# Patient Record
Sex: MALE | Race: WHITE
[De-identification: names, ages, dates, MRNs, and addresses within clinical notes are randomized per-mention and may not be internally consistent; named-entity substitution may affect disease eponyms.]

---

## 2020-02-18 ENCOUNTER — HOSPITAL ENCOUNTER (INPATIENT)
Dept: HOSPITAL 74 - JER | Age: 65
LOS: 2 days | Discharge: NURSING, REHAB OR LONG-TERM CARE | DRG: 65 | End: 2020-02-20
Attending: NURSE PRACTITIONER | Admitting: INTERNAL MEDICINE
Payer: COMMERCIAL

## 2020-02-18 VITALS — BODY MASS INDEX: 24.5 KG/M2

## 2020-02-18 DIAGNOSIS — I10: ICD-10-CM

## 2020-02-18 DIAGNOSIS — Z91.81: ICD-10-CM

## 2020-02-18 DIAGNOSIS — G81.94: ICD-10-CM

## 2020-02-18 DIAGNOSIS — I63.89: Primary | ICD-10-CM

## 2020-02-18 DIAGNOSIS — N17.9: ICD-10-CM

## 2020-02-18 LAB
ALBUMIN SERPL-MCNC: 3.8 G/DL (ref 3.4–5)
ALP SERPL-CCNC: 84 U/L (ref 45–117)
ALT SERPL-CCNC: 31 U/L (ref 13–61)
ANION GAP SERPL CALC-SCNC: 7 MMOL/L (ref 8–16)
AST SERPL-CCNC: 31 U/L (ref 15–37)
BASOPHILS # BLD: 0.9 % (ref 0–2)
BILIRUB SERPL-MCNC: 0.8 MG/DL (ref 0.2–1)
BUN SERPL-MCNC: 17.3 MG/DL (ref 7–18)
CALCIUM SERPL-MCNC: 8.8 MG/DL (ref 8.5–10.1)
CHLORIDE SERPL-SCNC: 104 MMOL/L (ref 98–107)
CHOLEST SERPL-MCNC: 141 MG/DL (ref 50–200)
CO2 SERPL-SCNC: 27 MMOL/L (ref 21–32)
CREAT SERPL-MCNC: 1.5 MG/DL (ref 0.55–1.3)
DEPRECATED RDW RBC AUTO: 13.6 % (ref 11.9–15.9)
EOSINOPHIL # BLD: 4.2 % (ref 0–4.5)
GLUCOSE SERPL-MCNC: 83 MG/DL (ref 74–106)
HCT VFR BLD CALC: 42.4 % (ref 35.4–49)
HDLC SERPL-MCNC: 36 MG/DL (ref 40–60)
HGB BLD-MCNC: 14.4 GM/DL (ref 11.7–16.9)
LDLC SERPL CALC-MCNC: 86 MG/DL (ref 5–100)
LYMPHOCYTES # BLD: 20.3 % (ref 8–40)
MCH RBC QN AUTO: 28.2 PG (ref 25.7–33.7)
MCHC RBC AUTO-ENTMCNC: 33.9 G/DL (ref 32–35.9)
MCV RBC: 83.2 FL (ref 80–96)
MONOCYTES # BLD AUTO: 10.1 % (ref 3.8–10.2)
NEUTROPHILS # BLD: 64.5 % (ref 42.8–82.8)
PLATELET # BLD AUTO: 244 K/MM3 (ref 134–434)
PMV BLD: 7.5 FL (ref 7.5–11.1)
POTASSIUM SERPLBLD-SCNC: 4 MMOL/L (ref 3.5–5.1)
PROT SERPL-MCNC: 7.2 G/DL (ref 6.4–8.2)
RBC # BLD AUTO: 5.09 M/MM3 (ref 4–5.6)
SODIUM SERPL-SCNC: 138 MMOL/L (ref 136–145)
TRIGL SERPL-MCNC: 141 MG/DL (ref 0–150)
WBC # BLD AUTO: 7.3 K/MM3 (ref 4–10)

## 2020-02-18 RX ADMIN — HEPARIN SODIUM SCH UNIT: 5000 INJECTION, SOLUTION INTRAVENOUS; SUBCUTANEOUS at 23:27

## 2020-02-18 NOTE — ECHO
Version:  1



Name:  SANDIP WYNNE                             Exam: Adult Echocardiogram

MRN:  [id number]                                       Study Date:  2020, 3:17 PM

Age:  64 Years



 ____________________________________________________________________________________________________
__________



MMode/2D Measurements & Calculations





IVSd: 1.87 cm                                          LVIDs: 2.9 cm

LVIDd: 4.2 cm

LVPWd: 1.12 cm

LAV (MOD-bp):  47.0 ml

ACS: 2.20 cm                                           Ao root diam: 3.6 cm

LVOT diam: 2.28 cm                                     LA dimension: 3.4 cm



Doppler Measurements & Calculations



MV E max enrique: 57.3 cm/sec                              Med E/e':  17.2

MV A max enrique: 86.4 cm/sec                              Med Peak E' Enrique:  3.3 cm/sec

MV E/A: 0.66

Lat E/e':  9.5

Lat Peak E' Enrique:  6.0 cm/sec

Ao max P.1 mmHg                                    JACK(I,D): 3.7 cm

Ao mean P.18 mmHg                                  LV V1 mean: 56.7 cm/sec

Ao V2 max: 101.0 cm/sec                                LV V1 mean P.50 mmHg

                                                       TR max enrique: 207.5 cm/sec

                                                       TR max P.2 mmHg



 Left Ventricle

 The left ventricle is grossly normal size. There is moderate asymmetric left ventricular hypertrophy
. Left

 ventricular systolic function is normal. Ejection Fraction = 65%. The transmitral spectral Doppler f
low

 pattern is suggestive of impaired LV relaxation.

 Right Ventricle

 The right ventricle is normal in size and function.

 Atria

 Normal left and right atrial size and function.

 Mitral Valve

 The mitral valve is normal in structure and function. There is trace mitral regurgitation.

 Tricuspid Valve

 The tricuspid valve is normal in structure and function. There is mild tricuspid regurgitation.

 Aortic Valve

 The aortic valve is normal in structure and function. Mild aortic regurgitation.

 Pulmonic Valve

 The pulmonic valve is not well seen, but is grossly normal.

 Great Vessels

 The aortic root is normal size. Normal aortic arch, descending and ascending aorta.

 Pericardium/Pleura

 There is no pericardial effusion.







 Summary Statements

 The left ventricle is grossly normal size.

 There is moderate asymmetric left ventricular hypertrophy.

 Left ventricular systolic function is normal.

 Ejection Fraction = 65%.

 The right ventricle is normal in size and function.

 Normal left and right atrial size and function.

 The mitral valve is normal in structure and function.

 There is trace mitral regurgitation.

 The tricuspid valve is normal in structure and function.

 There is mild tricuspid regurgitation.

 The transmitral spectral Doppler flow pattern is suggestive of impaired LV relaxation.

 The aortic valve is normal in structure and function.

 Mild aortic regurgitation.

 The pulmonic valve is not well seen, but is grossly normal.

 The aortic root is normal size.

 Normal aortic arch, descending and ascending aorta

 There is no pericardial effusion.





                    Matthew Georgeemberg         2020, 4:49 PM

 Ordering Physician:  Carlos Salinas

 Referring Physician:  CARLOS SALINAS

 Performed By:  Cheryl Franks

## 2020-02-18 NOTE — HP
Admitting History and Physical





- Primary Care Physician


PCP: ON STAFF,NOT





- Admission


Chief Complaint: CVA in [place]


History of Present Illness: 





64y M hx of Htn, recent CVA on 2/14 with residual L sided weakness who 

presented to ED for further evaluation of his stroke. Pt is primarily German 

as per his  sister who is bedside the patient had 2 strokes since Thursday had 

gone to the emergency department with diagnosis of stroke and discharged home 

the same day.  [place] the [place] for further [place] as 

they were concerned that the patient was not getting optimal treatment.  The 

patient denies any new complaints he notes he does have residual weakness in 

his left arm and left leg however he is otherwise been feeling fine denies any 

fever, chills, headache, dizziness, palpitations, chest pain, abdominal pain, 

nausea, vomiting, vision changes, dysarthria, vertigo. 





History Source: Family Member (sister)


Limitations to Obtaining History: Language Barrier (German Speaking)





- Past Medical History


CNS: Yes: CVA (on 2/14 in [place])


Cardiovascular: Yes: HTN





- Smoking History


Smoking history: Never smoked


Have you smoked in the past 12 months: No





- Alcohol/Substance Use


Hx Alcohol Use: No


History of Substance Use: reports: None





- Social History


Usual Living Arrangement: Yes: With Spouse


Do you think of yourself as: Straight/Heterosexual


ADL: Independent


Occupation: unemployed


History of Recent Travel: Yes ([place])





Home Medications





- Allergies


Allergies/Adverse Reactions: 


 Allergies











Allergy/AdvReac Type Severity Reaction Status Date / Time


 


No Known Allergies Allergy   Verified 02/18/20 09:58














- Home Medications


Home Medications: 


Ambulatory Orders





Amlodipine Besylate [Norvasc -] 5 mg PO DAILY 02/18/20 


Aspirin [ASA -] 325 mg PO DAILY 02/18/20 


Losartan Potassium [Cozaar -] 50 mg PO DAILY 02/18/20 


Ramipril [Altace] 10 mg PO DAILY 02/18/20 











Family Medical History


Family Hx Cardiac Disorders: Father (Htn)





Review of Systems





- Review of Systems


Constitutional: reports: No Symptoms


Eyes: reports: No Symptoms


HENT: reports: No Symptoms


Neck: reports: No Symptoms


Cardiovascular: reports: No Symptoms


Respiratory: reports: No Symptoms


Gastrointestinal: reports: No Symptoms


Genitourinary: reports: No Symptoms


Breasts: reports: No Symptoms Reported


Musculoskeletal: reports: No Symptoms, Muscle Weakness


Integumentary: reports: No Symptoms


Neurological: reports: Numbness, Parasthesia, Unsteady Gait, Weakness (to LUE), 

Other (Left facial droop)


Endocrine: reports: No Symptoms


Hematology/Lymphatic: reports: No Symptoms


Psychiatric: reports: No Symptoms





Physical Examination


Vital Signs: 


 Vital Signs











Temperature  97.6 F   02/18/20 09:59


 


Pulse Rate  89   02/18/20 09:59


 


Respiratory Rate  16   02/18/20 09:59


 


Blood Pressure  170/98   02/18/20 09:59


 


O2 Sat by Pulse Oximetry (%)  98   02/18/20 09:59











Constitutional: Yes: Well Nourished, No Distress, Calm


Eyes: Yes: WNL, Conjunctiva Clear, EOM Intact


HENT: Yes: WNL, Atraumatic, Normocephalic


Neck: Yes: WNL, Supple, Trachea Midline


Cardiovascular: Yes: WNL, Regular Rate and Rhythm


Respiratory: Yes: WNL, Regular, CTA Bilaterally


Gastrointestinal: Yes: WNL, Normal Bowel Sounds


...Rectal Exam: Yes: Deferred


Renal/: Yes: WNL


Breast(s): Yes: WNL


Musculoskeletal: Yes: WNL


Extremities: Yes: WNL


Edema: No


Peripheral Pulses WNL: Yes


Peripheral Pulses: Left Radial: 2+, Right Radial: 2+, Left Doralis Pedis: 2+, 

Right Dorsalis Pedis: 2+, Left Femoral: 2+, Right Femoral: 2+


Integumentary: Yes: WNL


Neurological: Yes: Cran Nerves II-XII Intact, Facial Droop (to left), Weakness (

to LUE/LLE 3/5)


...Motor Strength: LUE (3/5), LLE (3/5)


Labs: 


 CBC, BMP





 02/18/20 10:47 





 02/18/20 10:47 











Imaging





- Results


Cat Scan: Report Reviewed (From [place]: Infra as well as supratentorial 

involutional changes noted with longstanding severe small vessel disease with 

ischemic white matter changes. Lacunar infarct withion the left sided thalmus 

and right sided basal ganglia compartment with an area of remote infarction 

within the left basal ganglia . No acute hemmorhage)


Ultrasound: Report Reviewed (Carotid dopplers: no evidence of hemodynamically 

significant stenosis)





Problem List





- Problems


(1) HTN (hypertension)


Assessment/Plan: 


on previous antihypertensives


c/w losartan, amlodopin


hold ramipril as Cr 1.6  is elevated with no know baseline


Code(s): I10 - ESSENTIAL (PRIMARY) HYPERTENSION   





(2) Prophylactic measure


Assessment/Plan: 


FEN


Fluids: Cr elevated, will give 1000cc NS bolus and assess fluid status


Electrolytes: monitor & replete as needed


Nutrition: low sodium diet





DVT


moderate risk


sq heparin


full dose ASA





Dispo


admitt as inpatient on tele


full code


discharge planning


Code(s): Z29.9 - ENCOUNTER FOR PROPHYLACTIC MEASURES, UNSPECIFIED   





(3) ANIYA (acute kidney injury)


Assessment/Plan: 


Cr 1.6 no known baseline


possible r/t dehydration-flew from OR today and came straight from airport


NS 1000 blous and continue @ 75cc/hr until tomorrow and re-assess


U/A and urine lytes pending


hold ramipril


avoid neprotoxic agents


is Cr does not decrease with investigate further-renal US, renal consult


Code(s): N17.9 - ACUTE KIDNEY FAILURE, UNSPECIFIED   





(4) Cerebrovascular accident (CVA)


Assessment/Plan: 


CVA on imaging from 2/14 from [place]


neurology consulsulted


Carotid dopplers done and noted


TTE ordered


Speeech/swallow eval requested


PT order


fall precautions


atorvastatin 80mg now and continue q hs


asa full dose


MRI for further evaluation





Code(s): I63.9 - CEREBRAL INFARCTION, UNSPECIFIED   


Qualifiers: 


   CVA mechanism: unspecified   Qualified Code(s): I63.9 - Cerebral infarction, 

unspecified   





(5) At risk for falls


Assessment/Plan: 


fall precautions


PT


Code(s): Z91.81 - HISTORY OF FALLING   





Visit type





- Emergency Visit


Emergency Visit: Yes


ED Registration Date: 02/18/20


Care time: The patient presented to the Emergency Department on the above date 

and was hospitalized for further evaluation of their emergent condition.





- New Patient


This patient is new to me today: Yes


Date on this admission: 02/18/20





- Critical Care


Critical Care patient: No





NIH Stroke Scale





- Last Known Well Date/Time & Onset


Date Last Known Well: 02/14/20


Time Last Known Well: 09:00





- Initial Evaluation


Level of consciousness: Alert


Ask patient the month and their age: Answers both correctly


Ask patient to open & close eyes; make fist and let go: Obeys both correctly


Best gaze (horizontal eye movement): Normal


Visual field testing: No visual field loss


Facial paresis (Show teeth/raise eyebrows/close eyes tight): Minor paralysis (

flattened nasolabial fold, asymmetry on smiling)


Motor Function: Left Arm: Drift


Motor Function:  Right Arm: Normal (extends arm 90 (or 45) degrees for 10 

seconds without drift


Motor Function:  Left Leg: Drift


Motor Function:  Right Leg: Normal (extends leg 30 degrees for 5 seconds 

without drift)


Limb Ataxia: Present in one limb


Sensory(Use pinprick test arms,legs,trunk,face/side to side): Normal


Best language (Describe picture, name items, read sentences): No Aphasia


Dysarthria (read several words): Normal articulation


Extinction and Inattention: No abnormality





- Total Score


NIH Stroke Scale Score: 4

## 2020-02-18 NOTE — PDOC
Documentation entered by Maryann Raymond SCRIBE, acting as scribe for Junior Nascimento MD.








Junior Nascimento MD:  This documentation has been prepared by the Segundo henry Xhesika, SCRIBE, under my direction and personally reviewed by me in its 

entirety.  I confirm that the documentation accurately reflects all work, 

treatment, procedures, and medical decision making performed by me.  





Attending Attestation





- Resident


Resident Name: OrjamiHoang





- ED Attending Attestation


I have performed the following: I have examined & evaluated the patient, The 

case was reviewed & discussed with the resident, I agree w/resident's findings 

& plan, Exceptions are as noted





- HPI


HPI: 





02/18/20 10:53


64y M hx of htn, recent cva with residual L sided weakness presents for further 

evaluation of his stroke.  Per the patient's sister who is bedside the patient 

had 2 strokes since Thursday had gone to the emergency department with 

diagnosis of stroke and discharged home.  [place] 

[place] further evaluation as they were concerned that the patient was not getting 

optimal treatment.  The patient denies any new complaints he notes he does have 

residual weakness in his left arm and left leg however he is otherwise been 

feeling fine denies any fever, chills, headache, dizziness, palpitations, chest 

pain, abdominal pain, nausea, vomiting, vision changes, dysarthria, vertigo. 





Physicial Exam:


GENERAL: The patient is awake, alert, and fully oriented, Nontoxic - in no 

acute distress.


HEAD: Normocephalic, atraumatic.


EYES: extraocular movements intact, sclera anicteric, conjunctiva clear.  

Visual fields intact by confrontation


ENT: Normal voice,  Moist mucous membranes.


NECK: Normal range of motion, supple


LUNGS: Breath sounds equal, clear to auscultation bilaterally.  No wheezes, no 

rhonchi, no rales.


HEART: Regular rate and rhythm, normal S1 and S2 without murmur, rub or gallop.


ABDOMEN: Soft, nontender, No guarding, no rebound.  No CVA tenderness


EXTREMITIES: Normal range of motion, no edema.  


NEUROLOGICAL: No facial assymetry, Normal speech, left upper extremity and left 

lower extremity 4/5, right upper extremity right lower extremity 5 out of 5, 

sensation intact throughout on upper and lower extremities. 


PSYCH: Normal mood, normal affect.


SKIN: Warm, Dry, normal turgor,








Will obtain screening blood work


The patient has no new complaints however he will likely benefit from close 

outpatient primary care and neurologic follow-up with potential physical 

therapy. 











- Physicial Exam


PE: 





02/22/20 10:18


see above





- Medical Decision Making





case was discussed with neurology - recommends admission for further medical 

optimization and as pt will likely nto easily get follow up being new to the 

area





**Heart Score/ECG Review





- ECG Impressions


Comment:: 





02/18/20 13:44


Twelve-lead EKG was performed and reviewed by me. 


There is normal sinus rhythm with a normal rate. 


Rate of 84


QT interval of 467


Nonspecific ST wave changes


LVH

## 2020-02-18 NOTE — EKG
Test Reason : 

Blood Pressure : ***/*** mmHG

Vent. Rate : 084 BPM     Atrial Rate : 084 BPM

   P-R Int : 180 ms          QRS Dur : 092 ms

    QT Int : 396 ms       P-R-T Axes : 008 036 061 degrees

   QTc Int : 467 ms

 

NORMAL SINUS RHYTHM

VOLTAGE CRITERIA FOR LEFT VENTRICULAR HYPERTROPHY

NONSPECIFIC ST AND T WAVE ABNORMALITY

PROLONGED QT

ABNORMAL ECG

NO PREVIOUS ECGS AVAILABLE

Confirmed by MD DAVID, LIZETH ([id number]) on 2/18/2020 4:23:56 PM

 

Referred By:             Confirmed By:LIZETH HERNANDEZ MD

## 2020-02-18 NOTE — PDOC
History of Present Illness





- General


Chief Complaint: CVA/TIA


Stated Complaint: CVA/TIA


Time Seen by Provider: 02/18/20 10:14


History Source: Patient, Family


Exam Limitations: Language Barrier (Pt refused , sister at bedside)





- History of Present Illness


Initial Comments: 





02/18/20 10:52


64M with a PMH of HTN who presents for further evaluation of a CVA. The patient 

had a CVA 4 days ago in [place] with residual L sided weakness. He presents 

today for further evaluation. He denies headache, numbness, tingling, chest pain

, SOB, nausea, vomiting, fever, chills, cough. He denies any worsening of his 

previously diagnosed stroke.





Past History





- Past Medical History


Allergies/Adverse Reactions: 


 Allergies











Allergy/AdvReac Type Severity Reaction Status Date / Time


 


No Known Allergies Allergy   Verified 02/18/20 09:58











Home Medications: 


Ambulatory Orders





Amlodipine Besylate [Norvasc -] 5 mg PO DAILY 02/18/20 


Aspirin [ASA -] 325 mg PO DAILY 02/18/20 


Losartan Potassium [Cozaar -] 50 mg PO DAILY 02/18/20 


Ramipril [Altace] 10 mg PO DAILY 02/18/20 








CVA: Yes


COPD: No


HTN: Yes





- Psycho Social/Smoking Cessation Hx


Smoking History: Never smoked


Have you smoked in the past 12 months: No


Hx Alcohol Use: No


Drug/Substance Use Hx: No





**Review of Systems





- Review of Systems


Able to Perform ROS?: Yes


Comments:: 





02/18/20 11:07


GENERAL/CONSTITUTIONAL: No fever or chills. No weakness.


HEAD, EYES, EARS, NOSE AND THROAT: No change in vision. No ear pain or 

discharge. No sore throat.


CARDIOVASCULAR: No chest pain, palpitations, or lightheadedness.


RESPIRATORY: No cough, wheezing, shortness of breath, or hemoptysis.


GASTROINTESTINAL: No abdominal pain, nausea, vomiting, diarrhea, or 

constipation. 


GENITOURINARY: No dysuria, frequency, hematuria, or change in urination.


MUSCULOSKELETAL: No joint or muscle swelling or pain. No neck or back pain.


SKIN: No rash or lesions. 


NEUROLOGIC: + for weakness. No headache, numbness, tingling, loss of 

consciousness, or change in strength/sensation.





Is the patient limited English proficient: No





*Physical Exam





- Vital Signs


 Last Vital Signs











Temp Pulse Resp BP Pulse Ox


 


 97.6 F   89   16   170/98   98 


 


 02/18/20 09:59  02/18/20 09:59  02/18/20 09:59  02/18/20 09:59  02/18/20 09:59














- Physical Exam





02/18/20 11:07


GENERAL: Well developed, well nourished. Awake and alert. No acute distress.


HEENT: Normocephalic, atraumatic. Hearing grossly normal. Moist mucous 

membranes. PERRLA, EOMI. No conjunctival pallor. Sclera are non-icteric. 

Oropharynx is clear.


NECK: Supple. Full ROM. No JVD.


CARDIOVASCULAR: Regular rate and rhythm. No murmurs, rubs, or gallops. Distal 

pulses are 2+ and symmetric. 


PULMONARY: No evidence of respiratory distress. Lungs clear to auscultation 

bilaterally. No wheezing, rales, or rhonchi.


ABDOMINAL: Soft. Non-tender. Non-distended. No rebound or guarding. 


GENITOURINARY: No CVA tenderness bilaterally.


MUSCULOSKELETAL: Normal range of motion at all joints. No bony deformities or 

tenderness. 


EXTREMITIES: No cyanosis. No clubbing. No edema. No calf tenderness or swelling.


SKIN: Warm and dry. Normal capillary refill. No rashes. No jaundice. 


NEUROLOGICAL: Alert, awake, appropriate. Cranial nerves 2-12 intact. No 

deficits to light touch and temperature in face, upper extremities and lower 

extremities. 3/5 strength in L biceps, triceps, quads, hamstrings and gastroc. 

Otherwise, 5/5 strength in deltoids, biceps, triceps, quadriceps, hamstrings, 

and gastrocnemius. Finger to nose normal bilaterally. Normal speech. Gait is 

normal without ataxia.


PSYCHIATRIC: Cooperative. Good eye contact. Appropriate mood and affect.








NIH Stroke Scale





- Last Known Well Date/Time & Onset


Date Last Known Well: 02/06/20


Time Last Known Well: 00:00





- Initial Evaluation


Level of consciousness: Alert


Ask patient the month and their age: Answers both correctly


Ask patient to open & close eyes; make fist and let go: Obeys both correctly


Best gaze (horizontal eye movement): Normal


Visual field testing: No visual field loss


Facial paresis (Show teeth/raise eyebrows/close eyes tight): Normal symmetrical 

movement


Motor Function: Left Arm: Drift


Motor Function:  Right Arm: Normal (extends arm 90 (or 45) degrees for 10 

seconds without drift


Motor Function:  Left Leg: Drift


Motor Function:  Right Leg: Normal (extends leg 30 degrees for 5 seconds 

without drift)


Limb Ataxia: Present in one limb


Sensory(Use pinprick test arms,legs,trunk,face/side to side): Normal


Best language (Describe picture, name items, read sentences): No Aphasia


Dysarthria (read several words): Normal articulation


Extinction and Inattention: No abnormality





- Total Score


NIH Stroke Scale Score: 3





tPA Exclusion checklist 3-4.5h





- Time Elapsed


Date last known well: 02/13/20


Time last known well: 00:00


Elaspsed time: 5 Day(s) and 13 Hour(s) and 1 Minutes 





- Thrombolytic Therapy Candidate


Is patient eligible for thrombolytic therapy: No





- Ineligibility reason(s)


Reasons No tPA given: Outside of window - delayed arrival





Critical Care Time/MDM Note





- Medical Decision Making


Note: 





02/18/20 11:10


64M with a PMH of HTN, noncompliant with his medications, who presents 5 days 

after being diagnosed with a CVA with L sided residual deficits. Pt otherwise 

well appearing and recently flew in 1 hour ago prior to arrival. Pt had 

paperwork from [place] showing a CT with infarcts and labs indicating a slightly 

elevated Cr of 1.5. Will repeat labs and communicate with social work for 

disposition, likely home with outpt neuro f/u.





02/18/20 12:16


Cr in our labs 1.5. Will d/w Dr. Prajapati, neuro, for outpt f/u.





02/18/20 12:44


Case d/w Dr. Prajapati who suggests admission for further workup. Will add lipid 

profile.





02/18/20 13:01


Pt endorsed to hospitalist NP for admission under Dr. Olivares.








Discharge





- Discharge Information


Problems reviewed: Yes


Clinical Impression/Diagnosis: 


Cerebrovascular accident (CVA)


Qualifiers:


 CVA mechanism: unspecified Qualified Code(s): I63.9 - Cerebral infarction, 

unspecified





Condition: Guarded





- Admission


Yes





- Follow up/Referral





- Patient Discharge Instructions





- Post Discharge Activity

## 2020-02-19 LAB
ALBUMIN SERPL-MCNC: 3.4 G/DL (ref 3.4–5)
ALP SERPL-CCNC: 78 U/L (ref 45–117)
ALT SERPL-CCNC: 40 U/L (ref 13–61)
ANION GAP SERPL CALC-SCNC: 6 MMOL/L (ref 8–16)
AST SERPL-CCNC: 35 U/L (ref 15–37)
BILIRUB SERPL-MCNC: 1 MG/DL (ref 0.2–1)
BUN SERPL-MCNC: 19.7 MG/DL (ref 7–18)
CALCIUM SERPL-MCNC: 8.8 MG/DL (ref 8.5–10.1)
CHLORIDE SERPL-SCNC: 104 MMOL/L (ref 98–107)
CO2 SERPL-SCNC: 28 MMOL/L (ref 21–32)
CREAT SERPL-MCNC: 1.5 MG/DL (ref 0.55–1.3)
DEPRECATED RDW RBC AUTO: 13.4 % (ref 11.9–15.9)
GLUCOSE SERPL-MCNC: 88 MG/DL (ref 74–106)
HCT VFR BLD CALC: 40.1 % (ref 35.4–49)
HGB BLD-MCNC: 13.6 GM/DL (ref 11.7–16.9)
INR BLD: 1.05 (ref 0.83–1.09)
MAGNESIUM SERPL-MCNC: 2.2 MG/DL (ref 1.8–2.4)
MCH RBC QN AUTO: 28.4 PG (ref 25.7–33.7)
MCHC RBC AUTO-ENTMCNC: 34 G/DL (ref 32–35.9)
MCV RBC: 83.5 FL (ref 80–96)
PLATELET # BLD AUTO: 237 K/MM3 (ref 134–434)
PMV BLD: 7.5 FL (ref 7.5–11.1)
POTASSIUM SERPLBLD-SCNC: 4.3 MMOL/L (ref 3.5–5.1)
PROT SERPL-MCNC: 6.5 G/DL (ref 6.4–8.2)
PT PNL PPP: 12.4 SEC (ref 9.7–13)
RBC # BLD AUTO: 4.8 M/MM3 (ref 4–5.6)
SODIUM SERPL-SCNC: 139 MMOL/L (ref 136–145)
WBC # BLD AUTO: 9.3 K/MM3 (ref 4–10)

## 2020-02-19 RX ADMIN — HEPARIN SODIUM SCH UNIT: 5000 INJECTION, SOLUTION INTRAVENOUS; SUBCUTANEOUS at 10:30

## 2020-02-19 RX ADMIN — ASPIRIN 81 MG SCH MG: 81 TABLET ORAL at 10:29

## 2020-02-19 RX ADMIN — LOSARTAN POTASSIUM SCH MG: 50 TABLET, FILM COATED ORAL at 10:30

## 2020-02-19 RX ADMIN — AMLODIPINE BESYLATE SCH MG: 5 TABLET ORAL at 10:30

## 2020-02-19 NOTE — CON.CARD
Consult


Consult Specialty:: Cardiology


Referred by:: Marshall


Reason for Consultation:: Stroke and hypertension





- History of Present Illness


Chief Complaint: Stroke and hypertension


History of Present Illness: 


The patient is a 64-year-old man, with a history of hypertension, status post 

stroke 2/14/2020 with left-sided weakness residual, who presented today for 

further evaluation of his stroke and poorly controlled blood pressure.





The patient denied history of coronary artery disease and angina.  He reported 

no important limitations nor symptoms on effort prior to the stroke.





He is currently comfortable in the chair.  He offers no specific complaints.





The patient is in sinus rhythm.  There are no acute ECG changes.  Evidence of 

left ventricular hypertrophy.Echocardiogram performed on 2/18/2020 showed that 

both ventricles are functioning normally.  There were no other clinically 

significant findings on the echo.





- History Source


History Provided By: Patient, Medical Record


Limitations to Obtaining History: No Limitations





- Past Medical History


CNS: Yes: CVA (on 2/14 in [place])


Cardio/Vascular: Yes: HTN





- Alcohol/Substance Use


Hx Alcohol Use: No


History of Substance Use: reports: None





- Smoking History


Smoking history: Never smoked


Have you smoked in the past 12 months: No





- Social History


ADL: Independent


Occupation: unemployed


History of Recent Travel: Yes ([place])





Home Medications





- Allergies


Allergies/Adverse Reactions: 


 Allergies











Allergy/AdvReac Type Severity Reaction Status Date / Time


 


No Known Allergies Allergy   Verified 02/18/20 09:58














- Home Medications


Home Medications: 


Ambulatory Orders





Amlodipine Besylate [Norvasc -] 5 mg PO DAILY 02/18/20 


Aspirin [ASA -] 325 mg PO DAILY 02/18/20 


Losartan Potassium [Cozaar -] 50 mg PO DAILY 02/18/20 


Ramipril [Altace] 10 mg PO DAILY 02/18/20 











Review of Systems





- Review of Systems


Constitutional: reports: No Symptoms


Eyes: reports: No Symptoms


HENT: reports: No Symptoms


Neck: reports: No Symptoms


Cardiovascular: reports: No Symptoms


Respiratory: reports: No Symptoms


Gastrointestinal: reports: No Symptoms


Genitourinary: reports: No Symptoms


Breasts: reports: No Symptoms Reported


Musculoskeletal: reports: No Symptoms


Integumentary: reports: No Symptoms


Neurological: reports: Weakness


Endocrine: reports: No Symptoms


Hematology/Lymphatic: reports: No Symptoms


Vital Signs: 


 Vital Signs











Temperature  98.3 F   02/19/20 05:37


 


Pulse Rate  88   02/19/20 05:37


 


Respiratory Rate  18   02/19/20 08:43


 


Blood Pressure  152/90   02/19/20 05:37


 


O2 Sat by Pulse Oximetry (%)  98   02/19/20 08:43











Constitutional: Yes: Well Nourished, No Distress, Calm


Eyes: Yes: WNL, Conjunctiva Clear, EOM Intact


HENT: Yes: WNL, Atraumatic, Normocephalic


Neck: Yes: WNL, Supple, Trachea Midline


Respiratory: Yes: WNL, Regular, CTA Bilaterally


Gastrointestinal: Yes: WNL, Normal Bowel Sounds, Soft


Renal/: Yes: WNL


Cardiovascular: Yes: WNL, Regular Rate and Rhythm


JVD: No


Carotid Bruit: No


PMI: Non-Displaced


Heart Sounds: Yes: S1, S2


Musculoskeletal: Yes: WNL


Extremities: Yes: WNL


Edema: No


Neurological: Yes: Weakness (Left-sided weakness)





- Other Data


Labs, Other Data: 


 CBC, BMP





 02/19/20 05:25 





 02/19/20 05:25 





 INR, PTT











INR  1.05  (0.83-1.09)   02/19/20  05:25    














Assessment/Plan





The patient is a 64-year-old man, with a history of hypertension, status post 

stroke 2/14/2020 with left-sided weakness residual, who presented today for 

further evaluation of his stroke and poorly controlled blood pressure.





The patient denied history of coronary artery disease and angina.  He reported 

no important limitations nor symptoms on effort prior to the stroke.





He is currently comfortable in the chair.  He offers no specific complaints.





The patient is in sinus rhythm.  There are no acute ECG changes.  Evidence of 

left ventricular hypertrophy.Echocardiogram performed on 2/18/2020 showed that 

both ventricles are functioning normally.  There were no other clinically 

significant findings on the echo.





There is no evidence of ischemia nor acute coronary syndrome.  The blood 

pressure is mildly elevated.  The patient is completely asymptomatic with left-

sided weakness.





Please arrange for an outpatient follow-up visit with   


Please do not hesitate to call us PRN.

## 2020-02-19 NOTE — CONSULT
Admitting History and Physical





- Primary Care Physician


PCP: Kevan Salinas





- Admission


History of Present Illness: 


 64-year-old man, with a history of hypertension, status post stroke 2/14/2020 

in [place], with left-sided weakness residual, admitted for evaluation of 

his stroke and poorly controlled blood pressure.


Cat Scan: Report Reviewed (From [place]: Infra as well as supratentorial 

involutional changes noted with longstanding severe small vessel disease with 

ischemic white matter changes. Lacunar infarct withion the left sided thalmus 

and right sided basal ganglia compartment with an area of remote infarction 

within the left basal ganglia . No acute hemmorhage)


Ultrasound: Report Reviewed (Carotid dopplers: no evidence of hemodynamically 

significant stenosis)


 Selected Entries











  02/18/20 02/18/20 02/18/20





  09:59 19:10 19:45


 


Breakfast   


 


Diet Tolerated   


 


Eating (Feeding   Independent





) Ability   


 


Temperature 97.6 F 97.7 F 98.4 F














  02/19/20 02/19/20 02/19/20





  02:00 05:37 09:53


 


Breakfast   75%


 


Diet Tolerated   Well


 


Eating (Feeding   





) Ability   


 


Temperature 98.0 F 98.3 F 








 Laboratory Tests











  02/18/20 02/19/20





  10:47 05:25


 


WBC  7.3  9.3








Passed Dysphagia screens x 2, and diet initiated with good tolerance, per staff.


History Source: Patient


Limitations to Obtaining History: No Limitations, Language Barrier





- Past Medical History


CNS: Yes: CVA (on 2/14 in [place])


Cardiovascular: Yes: HTN





- Smoking History


Smoking history: Never smoked


Have you smoked in the past 12 months: No





- Alcohol/Substance Use


Hx Alcohol Use: No


History of Substance Use: reports: None





- Social History


ADL: Independent


Occupation: unemployed


History of Recent Travel: Yes ([place])





History





- Admission


Reason For Visit: CVA





- Diagnostics


CT Scan: Report Reviewed (Cat Scan: Report Reviewed (From [place] [place]: Infra as 

well as supratentorial involutional changes noted with longstanding severe 

small vessel disease with ischemic white matter changes. Lacunar infarct 

withion the left sided thalmus and right sided basal ganglia compartment with 

an area of remote infarction within the left basal ganglia . No acute hemmorhage

) Ultrasound: Report Reviewed (Carotid dopplers: no evidence of hemodynamically 

significant stenosis))


MRI: Pending





- General


Mental Status: Alert and Oriented, Awake and Alert, Able to Follow Commands


Attention: Intact


Ability to Follow Directions: Excellent


Head/Neck Control: WFL





- Hearing


Hearing: Normal


Hearing Aide: No





Speech Evaluation





- Communication


Primary Language: Kuwaiti


Communication: Yes: Within Normal Limits


Oral Expression Ability: Yes: No Impairment





- Speech Production


Able to Make Needs Known: Yes: WNL


Intelligibility: Yes: WNL





- Speech Characteristics


Voice Loudness: Normal


Voice Pitch: Yes: Normal


Voice Phonatory-based Quality: Yes: Normal


Speech Pattern: Normal


Speech Clarity: < 100%


Nasal Resonance: Normal


Articulation: Yes: Precise


Rate of Speech: Intact





- Language/Auditory Comprehension


Follows: Yes: 2 Stage Simple Commands


Observation: Able to respond to yes/no queries: Yes, Yes/No Confusion: No, 

Comprehends Conversational Speech: Yes





- Language/Verbal Expression


Able to Respond to Simple Queries: Yes: WNL


Able to Communicate Wants and Needs: Yes: WNL


Functional Communication Status: Yes: WNL


Attention: Yes: Intact





- Memory/Perception


Long term Memory: Yes: WNL


Short Term Memory: Yes: WNL





- Swallow Evaluation/Bedside Assessment


Current Nutritional Intake: Regular, Thin Liquids


Oral Secretions: Yes: WFL


Dentition: Yes: Adequate


Facial Symmetry at Rest: Symmetrical


Facial Symmetry on Retraction: Symmetrical


Sensation: Normal


Against Resistance Opening: Normal


Against Resistance Closing: Normal


Pucker Lips: Normal


Smile: Normal


Lingual Movement: Normal, Symmetric


Lingual Speed of Movement: Normal


Lingual Movement Strgth Against Opposition: Normal


Lingual Movement Characteristics: Normal


Velopharyngeal Movement: Normal


Laryngeal Elevation: WFL


Laryngeal Movement: Able to Palpate


Rate of Intake: WFL


Bolus Size: WFL


Labial Seal: WFL


Chewing: WFL


Oral Prep Time: WFL


A-P Transit: WFL


Pocketing: None


Timing of Swallow: WFL


Coughing/Throat Clear: No


Change in Voice: No





Recommendations





- Speech Evaluation, Impression/Plan


Impression: Speech,swallow,language, cognition intact.





- Dysphagia Impressions/Plan


Swallowing Skills: WFL


Dysphagia Impressions: No Impairment


*Silent aspiration: cannot be R/O at bedside





- Recommendations


Diet Consistency: Regular


Medication Administration: Whole with water


Liquids: Thin Liquids

## 2020-02-19 NOTE — PN
Physical Exam: 


SUBJECTIVE: Patient seen and examined at the bedside.  his family is present.  

patient denies any headaches or chest pain.  





OBJECTIVE:


Patient is a 64 year old male with a significant past medical history of a 

recent CVA on 2/14/2020 with residual left sided weakness.   Patient presents 

to the ED on 2/18/2020 for further evaluation.  Patient reports two more 

strokes since Thursday and has been to other EDs.  He presents to [facility] for 

further evaluation.  The patient denies any fever, chills, headache, dizziness, 

palpitations, chest pain, abdominal pain, nausea, vomiting, vision changes, 

dysarthria, vertigo. 





imaging:


brain mri 2/19/2020: (1) moderate to marked bilateral periventricular chronic 

microvascular ischemic disease. (2) bilatral periventricular chronic lacunar 

infarcts someof which demonstrate chronic blood products in particular a 

chronic hemorrhagic focal infart in the left periventricular white matter and 

left posterior basal ganglia.  superimposed acute/subacute lacunar infarcts are 

present in the right centrum semiovale posteriorly involving the right 

posterior frontal/parietal junction.  no mass lesions or acute intracranial 

hemorrhage identified. 





 Vital Signs











 Period  Temp  Pulse  Resp  BP Sys/Valdez  Pulse Ox


 


 Last 24 Hr  97.7 F-98.4 F    16-18  136-160/  98-99








GENERAL: The patient is awake, alert, and fully oriented, in no acute distress.


HEAD: Normal with no signs of trauma.


EYES: PERRL, extraocular movements intact, sclera anicteric, conjunctiva clear. 

No ptosis. 


ENT: Ears normal, nares patent, oropharynx clear without exudates, moist mucous 

membranes.


NECK: Trachea midline, full range of motion, supple. 


LUNGS: Breath sounds equal, clear to auscultation bilaterally, no wheezes, no 

crackles, no accessory muscle use. 


HEART: Regular rate and rhythm 


ABDOMEN: Soft, nontender, nondistended, normoactive bowel sounds, no guarding, 

no rebound, no hepatosplenomegaly, no masses.


EXTREMITIES:   no edema. 


NEUROLOGICAL:   Normal speech, left sided weakness.


PSYCH: Normal mood, normal affect.


SKIN: Warm, dry, normal turgor, no rashes or lesions noted


 Laboratory Results - last 24 hr











  02/19/20 02/19/20 02/19/20





  05:25 05:25 05:25


 


WBC  9.3  


 


RBC  4.80  


 


Hgb  13.6  


 


Hct  40.1  


 


MCV  83.5  


 


MCH  28.4  


 


MCHC  34.0  


 


RDW  13.4  


 


Plt Count  237  


 


MPV  7.5  


 


PT with INR   12.40 


 


INR   1.05 


 


Sodium    139


 


Potassium    4.3


 


Chloride    104


 


Carbon Dioxide    28


 


Anion Gap    6 L


 


BUN    19.7 H


 


Creatinine    1.5 H


 


Est GFR (CKD-EPI)AfAm    55.43


 


Est GFR (CKD-EPI)NonAf    47.82


 


Random Glucose    88


 


Calcium    8.8


 


Magnesium    2.2


 


Total Bilirubin    1.0


 


AST    35


 


ALT    40


 


Alkaline Phosphatase    78


 


Total Protein    6.5


 


Albumin    3.4








Active Medications











Generic Name Dose Route Start Last Admin





  Trade Name Freq  PRN Reason Stop Dose Admin


 


Amlodipine Besylate  5 mg  02/19/20 10:00  02/19/20 10:30





  Norvasc -  PO   5 mg





  DAILY ASHER   Administration





     





     





     





     


 


Aspirin  81 mg  02/19/20 10:00  02/19/20 10:29





  Asa -  PO   81 mg





  DAILY ASHER   Administration





     





     





     





     


 


Atorvastatin Calcium  20 mg  02/19/20 22:00  





  Lipitor -  PO   





  HS ASHER   





     





     





     





     


 


Losartan Potassium  50 mg  02/19/20 10:00  02/19/20 10:30





  Cozaar -  PO   50 mg





  DAILY ASHER   Administration





     





     





     





     











ASSESSMENT/PLAN:








Problem List





- Problems


(1) Cerebrovascular accident (CVA)


Assessment/Plan: 


CVA on per brain mri.  neuro consulted and pending


CVA protocol initiated, patient on asa, lipitor.


neuro following


for rehab placement pending insurance approval


continue with fall precautions, gait training


monitor and control blood pressure


Code(s): I63.9 - CEREBRAL INFARCTION, UNSPECIFIED   


Qualifiers: 


   CVA mechanism: unspecified   Qualified Code(s): I63.9 - Cerebral infarction, 

unspecified   





(2) ANIYA (acute kidney injury)


Assessment/Plan: 


Cr 1.5 no known baseline


U/A and urine lytes pending


hold ramipril


avoid neprotoxic agents


Code(s): N17.9 - ACUTE KIDNEY FAILURE, UNSPECIFIED   





(3) At risk for falls


Assessment/Plan: 


physical therapy


Code(s): Z91.81 - HISTORY OF FALLING   





(4) HTN (hypertension)


Assessment/Plan: 


on coozar and amlodopine


Code(s): I10 - ESSENTIAL (PRIMARY) HYPERTENSION   





(5) Prophylactic measure


Code(s): Z29.9 - ENCOUNTER FOR PROPHYLACTIC MEASURES, UNSPECIFIED   





Visit type





- Emergency Visit


Emergency Visit: Yes


ED Registration Date: 02/18/20


Care time: The patient presented to the Emergency Department on the above date 

and was hospitalized for further evaluation of their emergent condition.





- New Patient


This patient is new to me today: Yes


Date on this admission: 02/19/20





- Critical Care


Critical Care patient: No





- Discharge Referral


Referred to [facility] No

## 2020-02-19 NOTE — CONSULT
Consult - text type





- Consultation


Consultation Note: 





 Neurology





Admitting History and Physical





- Primary Care Physician


PCP: ON STAFF,NOT





- Admission


Chief Complaint: CVA in [place]


History Source: Family Member (sister)


Limitations to Obtaining History: Language Barrier ([place] Speaking)





History of Present Illness: 


64y M hx of Htn, recent CVA on 2/14 with residual L sided weakness who 

presented to ED for further evaluation of his stroke. Pt is primarily [place] 

as per his  sister who is bedside the patient had 2 strokes since Thursday had 

gone to the emergency department with diagnosis of stroke and discharged home 

the same day.  [place] further [place] as 

they were concerned that the patient was not getting optimal treatment.  The 

patient denied any new complaints he noted he does have residual weakness in 

his left arm and left leg however he is otherwise been feeling fine. Denied any 

fever, chills, headache, dizziness, palpitations, chest pain, abdominal pain, 

nausea, vomiting, vision changes, dysarthria, vertigo. Carotid u/s completed 

and showed intimal tickening and small soft plaque in R. bulb as well as mild 

intimal thickening at L. CCA bifurcation without HD significant stenosis. MRI 

of brain reviewed and awaiting official report. Patient indicated that in 

[place] he was not taking a daily aspirin and therefore should be started 

on 81 mg daily.  He has been put on 325 full dose aspirin which would not be 

needed at this pointsince he was aspirin winnie.   LDL was at 86, can reduce 

statin to 20mg, goal LDL < 70. Continues to have left-sided deficits and would 

benefit from physical therapy as well as short-term rehabilitation.





- Past Medical History


CNS: Yes: CVA (on 2/14 in [place])


Cardiovascular: Yes: HTN





- Smoking History


Smoking history: Never smoked


Have you smoked in the past 12 months: No





- Alcohol/Substance Use


Hx Alcohol Use: No


History of Substance Use: reports: None





- Social History


Usual Living Arrangement: Yes: With Spouse


Do you think of yourself as: Straight/Heterosexual


ADL: Independent


Occupation: unemployed


History of Recent Travel: Yes ([place])





Family Medical History


Family Hx Cardiac Disorders: Father (Htn)





Review of Systems





- Review of Systems


Constitutional: reports: No Symptoms


Eyes: reports: No Symptoms


HENT: reports: No Symptoms


Neck: reports: No Symptoms


Cardiovascular: reports: No Symptoms


Respiratory: reports: No Symptoms


Gastrointestinal: reports: No Symptoms


Genitourinary: reports: No Symptoms


Breasts: reports: No Symptoms Reported


Musculoskeletal: reports: No Symptoms, Muscle Weakness


Integumentary: reports: No Symptoms


Neurological: reports: Numbness, Parasthesia, Unsteady Gait, Weakness (to LUE), 

Other (Left facial droop)


Endocrine: reports: No Symptoms


Hematology/Lymphatic: reports: No Symptoms


Psychiatric: reports: No Symptoms


Home Medications





- Allergies


Allergies/Adverse Reactions: 


 Allergies











Allergy/AdvReac Type Severity Reaction Status Date / Time


 


No Known Allergies Allergy   Verified 02/18/20 09:58














- Home Medications


Home Medications: 


Ambulatory Orders





Amlodipine Besylate [Norvasc -] 5 mg PO DAILY 02/18/20 


Aspirin [ASA -] 325 mg PO DAILY 02/18/20 


Losartan Potassium [Cozaar -] 50 mg PO DAILY 02/18/20 


Ramipril [Altace] 10 mg PO DAILY 02/18/20 





Active Medications





Amlodipine Besylate (Norvasc -)  5 mg PO DAILY [facility]


Aspirin (Asa -)  325 mg PO DAILY [facility]


Atorvastatin Calcium (Lipitor -)  80 mg PO HS [facility]


Heparin Sodium (Porcine) (Heparin -)  5,000 unit SQ BID [facility]


   Last Admin: 02/18/20 23:27 Dose:  5,000 unit


Influenza Virus Vaccine Quadrival (Flulaval Quad 1414-0900)  60 mcg IM .ONCE ONE


   Stop: 02/19/20 10:01


Losartan Potassium (Cozaar -)  50 mg PO DAILY [facility]


Pneumococcal 13-Valent Conj Vacc (Prevnar 13 Syringe -)  0.5 ml IM .ONCE ONE


   Stop: 02/19/20 10:01








Physical Examination


Vital Signs: 


 Vital Signs











 Period  Temp  Pulse  Resp  BP Sys/Valdez  Pulse Ox


 


 Last 24 Hr  97.6 F-98.4 F  78-89  16-18  144-170/90-99  











Constitutional: Yes: Well Nourished, No Distress, Calm


Eyes: Yes: WNL, Conjunctiva Clear, EOM Intact


HENT: Yes: WNL, Atraumatic, Normocephalic


Neck: Yes: WNL, Supple, Trachea Midline


Cardiovascular: Yes: WNL, Regular Rate and Rhythm


Respiratory: Yes: WNL, Regular, CTA Bilaterally


Gastrointestinal: Yes: WNL, Normal Bowel Sounds


...Rectal Exam: Yes: Deferred


Renal/: Yes: WNL


Breast(s): Yes: WNL


Musculoskeletal: Yes: WNL


Extremities: Yes: WNL


Edema: No


Peripheral Pulses WNL: Yes


Peripheral Pulses: Left Radial: 2+, Right Radial: 2+, Left Doralis Pedis: 2+, 

Right Dorsalis Pedis: 2+, Left Femoral: 2+, Right Femoral: 2+


Integumentary: Yes: WNL


Neurological: Yes: Cran Nerves II-XII Intact, Facial Droop (to left), LUE 4-/5, 

LLE 3+/5, sensory diminished to PP in LLE>LUE, finger to nose intact





Labs: 


 CBCD











WBC  9.3 K/mm3 (4.0-10.0)   02/19/20  05:25    


 


RBC  4.80 M/mm3 (4.00-5.60)   02/19/20  05:25    


 


Hgb  13.6 GM/dL (11.7-16.9)   02/19/20  05:25    


 


Hct  40.1 % (35.4-49)   02/19/20  05:25    


 


MCV  83.5 fl (80-96)   02/19/20  05:25    


 


MCHC  34.0 g/dl (32.0-35.9)   02/19/20  05:25    


 


RDW  13.4 % (11.9-15.9)   02/19/20  05:25    


 


Plt Count  237 K/MM3 (134-434)   02/19/20  05:25    


 


MPV  7.5 fl (7.5-11.1)   02/19/20  05:25    








 CMP











Sodium  139 mmol/L (136-145)   02/19/20  05:25    


 


Potassium  4.3 mmol/L (3.5-5.1)   02/19/20  05:25    


 


Chloride  104 mmol/L ()   02/19/20  05:25    


 


Carbon Dioxide  28 mmol/L (21-32)   02/19/20  05:25    


 


Anion Gap  6 MMOL/L (8-16)  L  02/19/20  05:25    


 


BUN  19.7 mg/dL (7-18)  H  02/19/20  05:25    


 


Creatinine  1.5 mg/dL (0.55-1.3)  H  02/19/20  05:25    


 


Random Glucose  88 mg/dL ()   02/19/20  05:25    


 


Calcium  8.8 mg/dL (8.5-10.1)   02/19/20  05:25    


 


Total Bilirubin  1.0 mg/dL (0.2-1)   02/19/20  05:25    


 


AST  35 U/L (15-37)   02/19/20  05:25    


 


ALT  40 U/L (13-61)   02/19/20  05:25    


 


Alkaline Phosphatase  78 U/L ()   02/19/20  05:25    


 


Total Protein  6.5 g/dl (6.4-8.2)   02/19/20  05:25    


 


Albumin  3.4 g/dl (3.4-5.0)   02/19/20  05:25    











Plan:


64y M hx of Htn, recent CVA on 2/14 with residual L sided weakness who 

presented to ED for further evaluation of his stroke. Pt is primarily [place] 

as per his  sister who is bedside the patient had 2 strokes since Thursday had 

gone to the emergency department with diagnosis of stroke and discharged home 

the same day.  [place] as 

they were concerned that the patient was not getting optimal treatment.  The 

patient denied any new complaints he noted he does have residual weakness in 

his left arm and left leg however he is otherwise been feeling fine. Denied any 

fever, chills, headache, dizziness, palpitations, chest pain, abdominal pain, 

nausea, vomiting, vision changes, dysarthria, vertigo. Carotid u/s completed 

and showed intimal tickening and small soft plaque in R. bulb as well as mild 

intimal thickening at L. CCA bifurcation without HD significant stenosis. MRI 

of brain reviewed and awaiting official report.  Patient indicated that in 

[place] he was not taking a daily aspirin and therefore should be started 

on 81 mg daily.  He has been put on 325 full dose aspirin which would not be 

needed at this pointsince he was aspirin winnie.   LDL was at 86, can reduce 

statin to 20mg, goal LDL < 70. Continues to have left-sided deficits and would 

benefit from physical therapy as well as short-term rehabilitation. Monitor 

blood pressure, maintain less than 160/90 for now, < 140/90 as outpatient. Fall 

precautions, DVT ppx.

## 2020-02-20 VITALS — HEART RATE: 99 BPM | SYSTOLIC BLOOD PRESSURE: 132 MMHG | DIASTOLIC BLOOD PRESSURE: 75 MMHG | TEMPERATURE: 98.2 F

## 2020-02-20 RX ADMIN — AMLODIPINE BESYLATE SCH MG: 5 TABLET ORAL at 09:49

## 2020-02-20 RX ADMIN — LOSARTAN POTASSIUM SCH MG: 50 TABLET, FILM COATED ORAL at 09:50

## 2020-02-20 RX ADMIN — ASPIRIN 81 MG SCH MG: 81 TABLET ORAL at 09:49

## 2020-02-20 NOTE — DS
Physical Exam: 


SUBJECTIVE: Patient seen and examined.  Sitting in chair, tolerating room air 

and in no acute distress.  Wants to go to rehab.  Denies any malaise, only 

complaint is a dry cough that kept him up last night. 





OBJECTIVE:


Patient is a 64 year old male with a significant past medical history of a 

recent CVA on 2/14/2020 with residual left sided weakness.   Patient presents 

to the ED on 2/18/2020 for further evaluation.  Patient reports two more 

strokes since Thursday and has been to other EDs.  He presents to [facility] for 

further evaluation.  The patient denies any fever, chills, headache, dizziness, 

palpitations, chest pain, abdominal pain, nausea, vomiting, vision changes, 

dysarthria, vertigo. 





imaging:


brain mri 2/19/2020: (1) moderate to marked bilateral periventricular chronic 

microvascular ischemic disease. (2) bilatral periventricular chronic lacunar 

infarcts someof which demonstrate chronic blood products in particular a 

chronic hemorrhagic focal infart in the left periventricular white matter and 

left posterior basal ganglia.  superimposed acute/subacute lacunar infarcts are 

present in the right centrum semiovale posteriorly involving the right 

posterior frontal/parietal junction.  no mass lesions or acute intracranial 

hemorrhage identified. 








 Vital Signs











 Period  Temp  Pulse  Resp  BP Sys/Valdez  Pulse Ox


 


 Last 24 Hr  98.1 F-98.8 F    20-20  127-136/83-86  








PHYSICAL EXAM


GENERAL: The patient is awake, alert, and fully oriented, in no acute distress.


HEAD: Normal with no signs of trauma.


EYES: PERRL, extraocular movements intact, sclera anicteric, conjunctiva clear. 

No ptosis. 


ENT: Ears normal, nares patent, oropharynx clear without exudates, moist mucous 

membranes.


NECK: Trachea midline, full range of motion, supple. 


LUNGS: Breath sounds equal, clear to auscultation bilaterally, no wheezes, no 

crackles, no accessory muscle use. 


HEART: Regular rate and rhythm 


ABDOMEN: Soft, nontender, nondistended, normoactive bowel sounds, no guarding, 

no rebound, no hepatosplenomegaly, no masses.


EXTREMITIES:   no edema. 


NEUROLOGICAL:   Normal speech, left sided weakness.


PSYCH: Normal mood, normal affect.


SKIN: Warm, dry, normal turgor, no rashes or lesions noted


  


LABS





HOSPITAL COURSE:





Date of Admission:02/18/20





Date of Discharge: 02/20/20











Minutes to complete discharge: 60





Discharge Summary


Problems reviewed: Yes


Reason For Visit: CVA


Current Active Problems





ANIYA (acute kidney injury) (Acute)


At risk for falls (Acute)


Cerebrovascular accident (CVA) (Acute)


HTN (hypertension) (Acute)


Prophylactic measure (Acute)








Condition: Improved





- Instructions


Diet, Activity, Other Instructions: 


discharge to [facility]


Referrals: 


Clarence Prajapati MD [Staff Physician] - 


Prabhakar Mann MD [Staff Physician] - 


Disposition: SKILLED NURSING FACILITY





- Home Medications


Comprehensive Discharge Medication List: 


Ambulatory Orders





Amlodipine Besylate [Norvasc -] 5 mg PO DAILY 02/18/20 


Aspirin [ASA -] 81 mg PO DAILY  tab.chew 02/20/20 


Atorvastatin Ca [Lipitor] 20 mg PO HS  tablet 02/20/20 


Heparin - 5,000 unit SQ BID  vial 02/20/20 


Losartan Potassium [Cozaar -] 50 mg PO DAILY  tablet 02/20/20 











Problem List





- Problems


(1) Cerebrovascular accident (CVA)


Assessment/Plan: 


CVA on per brain MRI, left sided weakness


Neuro following


CVA protocol initiated, patient on asa, lipitor.


for rehab placement today


continue with fall precautions, gait training


monitor and control blood pressure





Code(s): I63.9 - CEREBRAL INFARCTION, UNSPECIFIED   


Qualifiers: 


   CVA mechanism: unspecified   Qualified Code(s): I63.9 - Cerebral infarction, 

unspecified   





(2) ANIYA (acute kidney injury)


Assessment/Plan: 


Cr 1.5 no known baseline


hold ramipril


avoid neprotoxic agents


Code(s): N17.9 - ACUTE KIDNEY FAILURE, UNSPECIFIED   





(3) At risk for falls


Assessment/Plan: 


physical therapy at rehab


Code(s): Z91.81 - HISTORY OF FALLING   





(4) HTN (hypertension)


Assessment/Plan: 


controlled on coozar and amlodopine


Code(s): I10 - ESSENTIAL (PRIMARY) HYPERTENSION   





(5) Prophylactic measure


Assessment/Plan: 


heparin bid, discharge to rehab.


Code(s): Z29.9 - ENCOUNTER FOR PROPHYLACTIC MEASURES, UNSPECIFIED   


This patient is new to me today: No


Emergency Visit: Yes


ED Registration Date: 02/18/20


Care time: The patient presented to the Emergency Department on the above date 

and was hospitalized for further evaluation of their emergent condition.


Critical Care patient: No





- Discharge Referral


Referred to [facility] No

## 2020-02-20 NOTE — PN
Physical Exam: 


SUBJECTIVE: Patient seen and examined at the bedside.  feels well today, 

sitting in chair.  willing to go to rehab.  has a dry cough.





OBJECTIVE:


Patient is a 64 year old male with a significant past medical history of a 

recent CVA on 2/14/2020 with residual left sided weakness.   Patient presents 

to the ED on 2/18/2020 for further evaluation.  Patient reports two more 

strokes since Thursday and has been to other EDs.  He presents to [facility] for 

further evaluation.  The patient denies any fever, chills, headache, dizziness, 

palpitations, chest pain, abdominal pain, nausea, vomiting, vision changes, 

dysarthria, vertigo. 





imaging:


brain mri 2/19/2020: (1) moderate to marked bilateral periventricular chronic 

microvascular ischemic disease. (2) bilatral periventricular chronic lacunar 

infarcts someof which demonstrate chronic blood products in particular a 

chronic hemorrhagic focal infart in the left periventricular white matter and 

left posterior basal ganglia.  superimposed acute/subacute lacunar infarcts are 

present in the right centrum semiovale posteriorly involving the right 

posterior frontal/parietal junction.  no mass lesions or acute intracranial 

hemorrhage identified. 





 Vital Signs











 Period  Temp  Pulse  Resp  BP Sys/Valdez  Pulse Ox


 


 Last 24 Hr  98.1 F-98.8 F    20-20  127-136/83-86  








GENERAL: The patient is awake, alert, and fully oriented, in no acute distress.


HEAD: Normal with no signs of trauma.


EYES: PERRL, extraocular movements intact, sclera anicteric, conjunctiva clear. 

No ptosis. 


ENT: Ears normal, nares patent, oropharynx clear without exudates, moist mucous 

membranes.


NECK: Trachea midline, full range of motion, supple. 


LUNGS: Breath sounds equal, clear to auscultation bilaterally, no wheezes, no 

crackles, no accessory muscle use. 


HEART: Regular rate and rhythm 


ABDOMEN: Soft, nontender, nondistended, normoactive bowel sounds, no guarding, 

no rebound, no hepatosplenomegaly, no masses.


EXTREMITIES:   no edema. 


NEUROLOGICAL:   Normal speech, left sided weakness.


PSYCH: Normal mood, normal affect.


SKIN: Warm, dry, normal turgor, no rashes or lesions noted


Active Medications











Generic Name Dose Route Start Last Admin





  Trade Name Freq  PRN Reason Stop Dose Admin


 


Amlodipine Besylate  5 mg  02/19/20 10:00  02/20/20 09:49





  Norvasc -  PO   5 mg





  DAILY ASHER   Administration





     





     





     





     


 


Aspirin  81 mg  02/19/20 10:00  02/20/20 09:49





  Asa -  PO   81 mg





  DAILY ASHER   Administration





     





     





     





     


 


Atorvastatin Calcium  20 mg  02/19/20 22:00  02/19/20 22:01





  Lipitor -  PO   20 mg





  HS ASHER   Administration





     





     





     





     


 


Losartan Potassium  50 mg  02/19/20 10:00  02/20/20 09:50





  Cozaar -  PO   50 mg





  DAILY SAHER   Administration





     





     





     





     











ASSESSMENT/PLAN:








Problem List





- Problems


(1) Cerebrovascular accident (CVA)


Code(s): I63.9 - CEREBRAL INFARCTION, UNSPECIFIED   


Qualifiers: 


   CVA mechanism: unspecified   Qualified Code(s): I63.9 - Cerebral infarction, 

unspecified   





(2) ANIYA (acute kidney injury)


Code(s): N17.9 - ACUTE KIDNEY FAILURE, UNSPECIFIED   





(3) At risk for falls


Code(s): Z91.81 - HISTORY OF FALLING   





(4) HTN (hypertension)


Code(s): I10 - ESSENTIAL (PRIMARY) HYPERTENSION   





(5) Prophylactic measure


Code(s): Z29.9 - ENCOUNTER FOR PROPHYLACTIC MEASURES, UNSPECIFIED

## 2020-02-20 NOTE — PN
Progress Note (short form)





- Note


Progress Note: 





 Neurology








History of Present Illness: 


64y M hx of Htn, recent CVA on 2/14 with residual L sided weakness who 

presented to ED for further evaluation of his stroke. Pt is primarily [place] 

as per his  sister who is bedside the patient had 2 strokes since Thursday had 

gone to the emergency department with diagnosis of stroke and discharged home 

the same day.  [place] further [place] as 

they were concerned that the patient was not getting optimal treatment.  The 

patient denied any new complaints he noted he does have residual weakness in 

his left arm and left leg however he is otherwise been feeling fine. Denied any 

fever, chills, headache, dizziness, palpitations, chest pain, abdominal pain, 

nausea, vomiting, vision changes, dysarthria, vertigo. Carotid u/s completed 

and showed intimal tickening and small soft plaque in R. bulb as well as mild 

intimal thickening at L. CCA bifurcation without HD significant stenosis. MRI 

of brain reviewed and demonstrated chronic microvascular ischemic disease. B/L 

periventricular chronic lacunar infarcts some of wich showes chronic blood 

products in particular a chronic hemorrhagic focal infarct in L. 

periventricular white matter and L. posterior basal ganglia. Superimposed acute/

subacute lacunar infarcts in R. centrum semiovale involving R. posterior frontal

/parietal junction. Patient indicated that in [place] he was not taking a 

daily aspirin and therefore should be started on 81 mg daily.  He has been put 

on 325 full dose aspirin which would not be needed at this point since he was 

aspirin winnie.   LDL was at 86, can reduce statin to 20mg, goal LDL < 70. 

Demonstrated some increased effort on left side with slightly improved  strength

, patient motivated forrehabilitation.  I provided him with further information 

for outpatient follow-up





Active Medications





Amlodipine Besylate (Norvasc -)  5 mg PO DAILY [facility]


   Last Admin: 02/19/20 10:30 Dose:  5 mg


Aspirin (Asa -)  81 mg PO DAILY [facility]


   Last Admin: 02/19/20 10:29 Dose:  81 mg


Atorvastatin Calcium (Lipitor -)  20 mg PO HS [facility]


   Last Admin: 02/19/20 22:01 Dose:  20 mg


Losartan Potassium (Cozaar -)  50 mg PO DAILY [facility]


   Last Admin: 02/19/20 10:30 Dose:  50 mg








Physical Examination


Vital Signs: 


 Vital Signs











 Period  Temp  Pulse  Resp  BP Sys/Valdez  Pulse Ox


 


 Last 24 Hr  98.1 F-98.8 F    18-20  127-160/  98











Constitutional: Yes: Well Nourished, No Distress, Calm


Eyes: Yes: WNL, Conjunctiva Clear, EOM Intact


HENT: Yes: WNL, Atraumatic, Normocephalic


Neck: Yes: WNL, Supple, Trachea Midline


Cardiovascular: Yes: WNL, Regular Rate and Rhythm


Respiratory: Yes: WNL, Regular, CTA Bilaterally


Gastrointestinal: Yes: WNL, Normal Bowel Sounds


...Rectal Exam: Yes: Deferred


Renal/: Yes: WNL


Breast(s): Yes: WNL


Musculoskeletal: Yes: WNL


Extremities: Yes: WNL


Edema: No


Peripheral Pulses WNL: Yes


Peripheral Pulses: Left Radial: 2+, Right Radial: 2+, Left Doralis Pedis: 2+, 

Right Dorsalis Pedis: 2+, Left Femoral: 2+, Right Femoral: 2+


Integumentary: Yes: WNL


Neurological: Yes: Cran Nerves II-XII Intact, Facial Droop (to left), LUE 4-/5, 

LLE 3+/5, sensory diminished to PP in LLE>LUE, finger to nose intact





Labs: 


 CBCD











WBC  9.3 K/mm3 (4.0-10.0)   02/19/20  05:25    


 


RBC  4.80 M/mm3 (4.00-5.60)   02/19/20  05:25    


 


Hgb  13.6 GM/dL (11.7-16.9)   02/19/20  05:25    


 


Hct  40.1 % (35.4-49)   02/19/20  05:25    


 


MCV  83.5 fl (80-96)   02/19/20  05:25    


 


MCHC  34.0 g/dl (32.0-35.9)   02/19/20  05:25    


 


RDW  13.4 % (11.9-15.9)   02/19/20  05:25    


 


Plt Count  237 K/MM3 (134-434)   02/19/20  05:25    


 


MPV  7.5 fl (7.5-11.1)   02/19/20  05:25    








 CMP











Sodium  139 mmol/L (136-145)   02/19/20  05:25    


 


Potassium  4.3 mmol/L (3.5-5.1)   02/19/20  05:25    


 


Chloride  104 mmol/L ()   02/19/20  05:25    


 


Carbon Dioxide  28 mmol/L (21-32)   02/19/20  05:25    


 


Anion Gap  6 MMOL/L (8-16)  L  02/19/20  05:25    


 


BUN  19.7 mg/dL (7-18)  H  02/19/20  05:25    


 


Creatinine  1.5 mg/dL (0.55-1.3)  H  02/19/20  05:25    


 


Random Glucose  88 mg/dL ()   02/19/20  05:25    


 


Calcium  8.8 mg/dL (8.5-10.1)   02/19/20  05:25    


 


Total Bilirubin  1.0 mg/dL (0.2-1)   02/19/20  05:25    


 


AST  35 U/L (15-37)   02/19/20  05:25    


 


ALT  40 U/L (13-61)   02/19/20  05:25    


 


Alkaline Phosphatase  78 U/L ()   02/19/20  05:25    


 


Total Protein  6.5 g/dl (6.4-8.2)   02/19/20  05:25    


 


Albumin  3.4 g/dl (3.4-5.0)   02/19/20  05:25    











Plan:


64y M hx of Htn, recent CVA on 2/14 with residual L sided weakness who 

presented to ED for further evaluation of his stroke. Pt is primarily [place] 

as per his  sister who is bedside the patient had 2 strokes since Thursday had 

gone to the emergency department with diagnosis of stroke and discharged home 

the same day.  [place] further [place] as 

they were concerned that the patient was not getting optimal treatment.  The 

patient denied any new complaints he noted he does have residual weakness in 

his left arm and left leg however he is otherwise been feeling fine. Denied any 

fever, chills, headache, dizziness, palpitations, chest pain, abdominal pain, 

nausea, vomiting, vision changes, dysarthria, vertigo. Carotid u/s completed 

and showed intimal tickening and small soft plaque in R. bulb as well as mild 

intimal thickening at L. CCA bifurcation without HD significant stenosis. MRI 

of brain reviewed and demonstrated chronic microvascular ischemic disease. B/L 

periventricular chronic lacunar infarcts some of wich showes chronic blood 

products in particular a chronic hemorrhagic focal infarct in L. 

periventricular white matter and L. posterior basal ganglia. Superimposed acute/

subacute lacunar infarcts in R. centrum semiovale involving R. posterior frontal

/parietal junction.  Patient indicated that in [place] he was not taking a 

daily aspirin and therefore should be started on 81 mg daily.  He has been put 

on 325 full dose aspirin which would not be needed at this point since he was 

aspirin winnie.   LDL was at 86, can reduce statin to 20mg, goal LDL < 70. 

Demonstrated some increased effort on left side with slightly improved  strength

, patient motivated forrehabilitation.  I provided him with further information 

for outpatient follow-up Monitor blood pressure, maintain less than 160/90 for 

now, < 140/90 as outpatient. Fall precautions, DVT ppx. placement as per case 

manager.